# Patient Record
Sex: MALE | Race: WHITE
[De-identification: names, ages, dates, MRNs, and addresses within clinical notes are randomized per-mention and may not be internally consistent; named-entity substitution may affect disease eponyms.]

---

## 2020-04-22 ENCOUNTER — HOSPITAL ENCOUNTER (EMERGENCY)
Dept: HOSPITAL 47 - EC | Age: 60
LOS: 1 days | Discharge: HOME | End: 2020-04-23
Payer: COMMERCIAL

## 2020-04-22 VITALS — TEMPERATURE: 98.1 F | RESPIRATION RATE: 18 BRPM

## 2020-04-22 DIAGNOSIS — Z88.0: ICD-10-CM

## 2020-04-22 DIAGNOSIS — F17.200: ICD-10-CM

## 2020-04-22 DIAGNOSIS — M54.31: Primary | ICD-10-CM

## 2020-04-22 PROCEDURE — 72100 X-RAY EXAM L-S SPINE 2/3 VWS: CPT

## 2020-04-22 PROCEDURE — 96365 THER/PROPH/DIAG IV INF INIT: CPT

## 2020-04-22 PROCEDURE — 96361 HYDRATE IV INFUSION ADD-ON: CPT

## 2020-04-22 PROCEDURE — 36415 COLL VENOUS BLD VENIPUNCTURE: CPT

## 2020-04-22 PROCEDURE — 81001 URINALYSIS AUTO W/SCOPE: CPT

## 2020-04-22 PROCEDURE — 74018 RADEX ABDOMEN 1 VIEW: CPT

## 2020-04-22 PROCEDURE — 99284 EMERGENCY DEPT VISIT MOD MDM: CPT

## 2020-04-22 PROCEDURE — 96375 TX/PRO/DX INJ NEW DRUG ADDON: CPT

## 2020-04-22 PROCEDURE — 80053 COMPREHEN METABOLIC PANEL: CPT

## 2020-04-22 PROCEDURE — 85025 COMPLETE CBC W/AUTO DIFF WBC: CPT

## 2020-04-23 VITALS — HEART RATE: 90 BPM | SYSTOLIC BLOOD PRESSURE: 141 MMHG | DIASTOLIC BLOOD PRESSURE: 85 MMHG

## 2020-04-23 LAB
ALBUMIN SERPL-MCNC: 4.4 G/DL (ref 3.5–5)
ALP SERPL-CCNC: 52 U/L (ref 38–126)
ALT SERPL-CCNC: 79 U/L (ref 4–49)
ANION GAP SERPL CALC-SCNC: 13 MMOL/L
AST SERPL-CCNC: 53 U/L (ref 17–59)
BASOPHILS # BLD AUTO: 0.1 K/UL (ref 0–0.2)
BASOPHILS NFR BLD AUTO: 0 %
BUN SERPL-SCNC: 9 MG/DL (ref 9–20)
CALCIUM SPEC-MCNC: 9.5 MG/DL (ref 8.4–10.2)
CHLORIDE SERPL-SCNC: 102 MMOL/L (ref 98–107)
CO2 SERPL-SCNC: 22 MMOL/L (ref 22–30)
EOSINOPHIL # BLD AUTO: 0.1 K/UL (ref 0–0.7)
EOSINOPHIL NFR BLD AUTO: 1 %
ERYTHROCYTE [DISTWIDTH] IN BLOOD BY AUTOMATED COUNT: 5.7 M/UL (ref 4.3–5.9)
ERYTHROCYTE [DISTWIDTH] IN BLOOD: 13.7 % (ref 11.5–15.5)
GLUCOSE SERPL-MCNC: 115 MG/DL (ref 74–99)
HCT VFR BLD AUTO: 52.4 % (ref 39–53)
HGB BLD-MCNC: 17.3 GM/DL (ref 13–17.5)
LYMPHOCYTES # SPEC AUTO: 1.5 K/UL (ref 1–4.8)
LYMPHOCYTES NFR SPEC AUTO: 11 %
MCH RBC QN AUTO: 30.3 PG (ref 25–35)
MCHC RBC AUTO-ENTMCNC: 33 G/DL (ref 31–37)
MCV RBC AUTO: 91.9 FL (ref 80–100)
MONOCYTES # BLD AUTO: 0.9 K/UL (ref 0–1)
MONOCYTES NFR BLD AUTO: 6 %
NEUTROPHILS # BLD AUTO: 11.1 K/UL (ref 1.3–7.7)
NEUTROPHILS NFR BLD AUTO: 81 %
PH UR: 6 [PH] (ref 5–8)
PLATELET # BLD AUTO: 246 K/UL (ref 150–450)
POTASSIUM SERPL-SCNC: 3.9 MMOL/L (ref 3.5–5.1)
PROT SERPL-MCNC: 7.3 G/DL (ref 6.3–8.2)
PROT UR QL: (no result)
RBC UR QL: 2 /HPF (ref 0–5)
SODIUM SERPL-SCNC: 137 MMOL/L (ref 137–145)
SP GR UR: 1.02 (ref 1–1.03)
SQUAMOUS UR QL AUTO: <1 /HPF (ref 0–4)
UROBILINOGEN UR QL STRIP: 2 MG/DL (ref ?–2)
WBC # BLD AUTO: 13.7 K/UL (ref 3.8–10.6)
WBC #/AREA URNS HPF: 1 /HPF (ref 0–5)

## 2020-04-23 NOTE — ED
Back Pain HPI





- General


Chief Complaint: Back Pain/Injury


Stated Complaint: back pain


Time Seen by Provider: 04/22/20 23:35


Source: patient, EMS, RN notes reviewed, old records reviewed





- History of Present Illness


Initial Comments: 


Patient is a 59-year-old male whom arrives via EMS with CC of waking up with 

back pain and trying to do some stretches. He reports that after that he 

developed worseing back pain and shooting pain down R leg. Patient reports it 

was worse with movement and reports he was also concerned by the quick nature of

the pain. He reports hx of kidney stones, and questions if the pain is muscles 

and spine vs kidney stone. Denies nausea and vomiting. Pt denies falls, trauma 

or saddle anesthesia. 





- Related Data


                                  Previous Rx's











 Medication  Instructions  Recorded


 


Baclofen 10 mg PO TID #15 tab 04/23/20


 


Dexamethasone 0.75 mg PO DAILY #12 tab 04/23/20


 


Ibuprofen [Motrin] 600 mg PO Q6HR PRN #20 tab 04/23/20











                                    Allergies











Allergy/AdvReac Type Severity Reaction Status Date / Time


 


Penicillins Allergy  Dyspnea Verified 04/22/20 23:32














Review of Systems


ROS Statement: 


Those systems with pertinent positive or pertinent negative responses have been 

documented in the HPI.





ROS Other: All systems not noted in ROS Statement are negative.





Past Medical History


Past Medical History: COPD, Hypertension


Additional Past Surgical History / Comment(s): eye


Smoking Status: Current every day smoker


Past Alcohol Use History: Occasional


Past Drug Use History: None Reported





General Exam





- General Exam Comments


Initial Comments: 





59 year old male, mild discomfot. 


General appearance: alert, in no apparent distress


Head exam: Present: atraumatic, normocephalic, normal inspection


Eye exam: Present: normal appearance, PERRL, EOMI.  Absent: scleral icterus, 

conjunctival injection, periorbital swelling


ENT exam: Present: normal exam, mucous membranes moist


Neck exam: Present: normal inspection.  Absent: tenderness, meningismus, 

lymphadenopathy


Respiratory exam: Present: normal lung sounds bilaterally.  Absent: respiratory 

distress, wheezes, rales, rhonchi, stridor


Cardiovascular Exam: Present: regular rate, normal rhythm, normal heart sounds. 

 Absent: systolic murmur, diastolic murmur, rubs, gallop, clicks


GI/Abdominal exam: Present: soft, normal bowel sounds.  Absent: distended, 

tenderness, guarding, rebound, rigid


Extremities exam: Present: normal inspection, full ROM, normal capillary refill,

 other (2 plus dorsalis pedis and posterior tibial pulse. normal stregth in BLE,

 normal dorsiflexion and plantar flexion. Able to feel light touch distally. No 

swelling or skin changes. ).  Absent: tenderness, pedal edema, joint swelling, 

calf tenderness


Back exam: Present: normal inspection, tenderness (lumbar spine, and R 

paraspinal muscles ), paraspinal tenderness


Neurological exam: Present: alert, oriented X3, CN II-XII intact


Psychiatric exam: Present: normal affect, normal mood


Skin exam: Present: warm, dry, intact, normal color.  Absent: rash





Course


                                   Vital Signs











  04/22/20 04/23/20





  23:24 01:57


 


Temperature 98.1 F 


 


Pulse Rate 110 H 90


 


Respiratory 18 18





Rate  


 


Blood Pressure 147/85 141/85


 


O2 Sat by Pulse 96 96





Oximetry  














Medical Decision Making





- Medical Decision Making





59 year old with sudden onset of back pain with stretching today, occassional 

paresthesia in R leg. Presentation is consistent with back spasm and pinched 

sciatic nerve due to Disc protrusion on nerve root. Patient VSS, no falls, or 

saddle anesthesia. Labs were revewed including UA, which is normal. Unlikely a 

kidney stone.  Patient felt better after toradol, norflex, and solumedrol. 

Lumbar spine xray shows no fracture but disc degeneration. He was informed of 

renal stones on KUB but unlikely passing stone today as it is musculoskeletal in

 nature. Discussed return parameters and follow up. DC with note for work and 

Rx.  





- Lab Data


Result diagrams: 


                                 04/23/20 00:30





                                 04/23/20 00:30


                                   Lab Results











  04/23/20 04/23/20 04/23/20 Range/Units





  00:30 00:30 01:30 


 


WBC  13.7 H    (3.8-10.6)  k/uL


 


RBC  5.70    (4.30-5.90)  m/uL


 


Hgb  17.3    (13.0-17.5)  gm/dL


 


Hct  52.4    (39.0-53.0)  %


 


MCV  91.9    (80.0-100.0)  fL


 


MCH  30.3    (25.0-35.0)  pg


 


MCHC  33.0    (31.0-37.0)  g/dL


 


RDW  13.7    (11.5-15.5)  %


 


Plt Count  246    (150-450)  k/uL


 


Neutrophils %  81    %


 


Lymphocytes %  11    %


 


Monocytes %  6    %


 


Eosinophils %  1    %


 


Basophils %  0    %


 


Neutrophils #  11.1 H    (1.3-7.7)  k/uL


 


Lymphocytes #  1.5    (1.0-4.8)  k/uL


 


Monocytes #  0.9    (0-1.0)  k/uL


 


Eosinophils #  0.1    (0-0.7)  k/uL


 


Basophils #  0.1    (0-0.2)  k/uL


 


Sodium   137   (137-145)  mmol/L


 


Potassium   3.9   (3.5-5.1)  mmol/L


 


Chloride   102   ()  mmol/L


 


Carbon Dioxide   22   (22-30)  mmol/L


 


Anion Gap   13   mmol/L


 


BUN   9   (9-20)  mg/dL


 


Creatinine   0.65 L   (0.66-1.25)  mg/dL


 


Est GFR (CKD-EPI)AfAm   >90   (>60 ml/min/1.73 sqM)  


 


Est GFR (CKD-EPI)NonAf   >90   (>60 ml/min/1.73 sqM)  


 


Glucose   115 H   (74-99)  mg/dL


 


Calcium   9.5   (8.4-10.2)  mg/dL


 


Total Bilirubin   0.9   (0.2-1.3)  mg/dL


 


AST   53   (17-59)  U/L


 


ALT   79 H   (4-49)  U/L


 


Alkaline Phosphatase   52   ()  U/L


 


Total Protein   7.3   (6.3-8.2)  g/dL


 


Albumin   4.4   (3.5-5.0)  g/dL


 


Urine Color    Yellow  


 


Urine Appearance    Clear  (Clear)  


 


Urine pH    6.0  (5.0-8.0)  


 


Ur Specific Gravity    1.024  (1.001-1.035)  


 


Urine Protein    2+ H  (Negative)  


 


Urine Glucose (UA)    Negative  (Negative)  


 


Urine Ketones    Negative  (Negative)  


 


Urine Blood    Negative  (Negative)  


 


Urine Nitrite    Negative  (Negative)  


 


Urine Bilirubin    Negative  (Negative)  


 


Urine Urobilinogen    2.0  (<2.0)  mg/dL


 


Ur Leukocyte Esterase    Negative  (Negative)  


 


Urine RBC    2  (0-5)  /hpf


 


Urine WBC    1  (0-5)  /hpf


 


Ur Squamous Epith Cells    <1  (0-4)  /hpf


 


Urine Mucus    Moderate H  (None)  /hpf














- Radiology Data


Radiology results: report reviewed


KUB shows bilateral small renal stones, non acute abdomen. Lumbar spine shows 

DDD.


mild degenerative spurring, no fracture. There probably multiple small bilateral

 renal calculus.  





Disposition


Clinical Impression: 


 Back pain, Sciatica, right side





Disposition: HOME SELF-CARE


Condition: Good


Instructions (If sedation given, give patient instructions):  Sciatica (ED), 

Muscle Spasm (ED)


Additional Instructions: 


Patient advised follow-up with primary care physician.   Patient advised to take

 anti-inflammatory medicine and muscle relaxers as prescribed.  Return to 

emergency department if any alarming signs or symptoms occur.


Prescriptions: 


Baclofen 10 mg PO TID #15 tab


Dexamethasone 0.75 mg PO DAILY #12 tab


Ibuprofen [Motrin] 600 mg PO Q6HR PRN #20 tab


 PRN Reason: Pain


Is patient prescribed a controlled substance at d/c from ED?: No


Referrals: 


None,Stated [Primary Care Provider] - 1-2 days


Jake Dickens MD [REFERRING] - 1-2 days


Time of Disposition: 02:23

## 2020-04-23 NOTE — XR
EXAMINATION TYPE: XR KUB

 

DATE OF EXAM: 4/23/2020

 

COMPARISON: NONE

 

HISTORY: Flank pain

 

TECHNIQUE: 2 views

 

FINDINGS: There is no sign of intestinal obstruction or pneumoperitoneum. There are small calcificati
ons over both kidneys that measure up to 3 mm. Lung bases are clear. There is no evidence of a mass. 
Fecal pattern is normal.

 

IMPRESSION: There are probably small bilateral renal calculi. Nonacute abdomen.

## 2020-04-23 NOTE — XR
EXAMINATION TYPE: XR lumbar spine 2 or 3V

 

DATE OF EXAM: 4/23/2020

 

COMPARISON: NONE

 

HISTORY: Flank pain and back pain

 

TECHNIQUE: 3 views

 

FINDINGS: There is mild lumbar levoscoliosis. Disc spaces are fairly normal. There is no compression 
fracture. Posterior elements are intact. Sacroiliac joints appear normal. There is mild spurring of t
he endplates.

 

IMPRESSION: Mild degenerative spurring. No fracture seen. There probably multiple small bilateral betzaida
al calculi.